# Patient Record
Sex: FEMALE | Race: WHITE | NOT HISPANIC OR LATINO | Employment: FULL TIME | URBAN - METROPOLITAN AREA
[De-identification: names, ages, dates, MRNs, and addresses within clinical notes are randomized per-mention and may not be internally consistent; named-entity substitution may affect disease eponyms.]

---

## 2023-10-07 ENCOUNTER — HOSPITAL ENCOUNTER (EMERGENCY)
Facility: HOSPITAL | Age: 63
Discharge: HOME | End: 2023-10-07
Attending: EMERGENCY MEDICINE
Payer: MEDICARE

## 2023-10-07 VITALS
DIASTOLIC BLOOD PRESSURE: 80 MMHG | OXYGEN SATURATION: 96 % | WEIGHT: 135 LBS | HEART RATE: 84 BPM | BODY MASS INDEX: 24.84 KG/M2 | RESPIRATION RATE: 16 BRPM | SYSTOLIC BLOOD PRESSURE: 128 MMHG | TEMPERATURE: 98 F | HEIGHT: 62 IN

## 2023-10-07 DIAGNOSIS — M54.50 ACUTE BILATERAL LOW BACK PAIN WITHOUT SCIATICA: ICD-10-CM

## 2023-10-07 DIAGNOSIS — M54.6 ACUTE BILATERAL THORACIC BACK PAIN: Primary | ICD-10-CM

## 2023-10-07 PROCEDURE — 99284 EMERGENCY DEPT VISIT MOD MDM: CPT

## 2023-10-07 PROCEDURE — 63600175 PHARM REV CODE 636 W HCPCS: Performed by: EMERGENCY MEDICINE

## 2023-10-07 PROCEDURE — 96372 THER/PROPH/DIAG INJ SC/IM: CPT | Performed by: EMERGENCY MEDICINE

## 2023-10-07 PROCEDURE — 25000003 PHARM REV CODE 250: Performed by: EMERGENCY MEDICINE

## 2023-10-07 RX ORDER — KETOROLAC TROMETHAMINE 30 MG/ML
15 INJECTION, SOLUTION INTRAMUSCULAR; INTRAVENOUS
Status: COMPLETED | OUTPATIENT
Start: 2023-10-07 | End: 2023-10-07

## 2023-10-07 RX ORDER — METHOCARBAMOL 750 MG/1
750 TABLET, FILM COATED ORAL 3 TIMES DAILY
Qty: 15 TABLET | Refills: 0 | Status: SHIPPED | OUTPATIENT
Start: 2023-10-07 | End: 2023-10-12

## 2023-10-07 RX ORDER — ESCITALOPRAM OXALATE 10 MG/1
10 TABLET ORAL DAILY
COMMUNITY
Start: 2023-09-29

## 2023-10-07 RX ORDER — METHOCARBAMOL 750 MG/1
750 TABLET, FILM COATED ORAL
Status: COMPLETED | OUTPATIENT
Start: 2023-10-07 | End: 2023-10-07

## 2023-10-07 RX ORDER — NAPROXEN 500 MG/1
500 TABLET ORAL 2 TIMES DAILY WITH MEALS
Qty: 14 TABLET | Refills: 0 | Status: SHIPPED | OUTPATIENT
Start: 2023-10-07 | End: 2023-10-14

## 2023-10-07 RX ORDER — OMEPRAZOLE 20 MG/1
20 CAPSULE, DELAYED RELEASE ORAL DAILY
COMMUNITY
Start: 2023-08-02

## 2023-10-07 RX ADMIN — KETOROLAC TROMETHAMINE 15 MG: 30 INJECTION INTRAMUSCULAR; INTRAVENOUS at 02:10

## 2023-10-07 RX ADMIN — METHOCARBAMOL 750 MG: 750 TABLET ORAL at 02:10

## 2023-10-07 NOTE — ED PROVIDER NOTES
"Emergency Department Encounter  Provider Note    Yury Martell  [id number]  10/7/2023    Evaluation:    History Acquisition:     Chief Complaint   Patient presents with    Back Pain     Denies bowel or bladder incontinence        History of Present Illness:  Yury Martell is a 63 y.o. female who has a past medical history of Migraine headache.    The patient presents to the ED due to diffuse upper and lower back pain.   Patient reports symptoms started a few days ago after doing leg stretching exercises in bed.  She reports no pain afterward, but woke up the next morning with severe back pain.  She has history of chronic neck and back pain. She reports she is always stressed and "lives with muscle spasms."  She is requesting an MRI to know for sure what is going on with her back.   She has been taking Aleve, topical pain medication, and hydrocodone at home without improvement.       Additional historians utilized:  none    Prior medical records were reviewed:   History of diaphragmatic hernia, followed by GI    The patient's list of active medical problems, social history, medications, and allergies as documented has been reviewed.     Past Medical History:   Diagnosis Date    Migraine headache      Past Surgical History:   Procedure Laterality Date    CARPAL TUNNEL RELEASE      TUBAL LIGATION       No family history on file.  Social History     Socioeconomic History    Marital status:    Tobacco Use    Smoking status: Never    Smokeless tobacco: Never   Substance and Sexual Activity    Alcohol use: No    Sexual activity: Never     Review of patient's allergies indicates:   Allergen Reactions    Codeine Rash       Review of Systems   Musculoskeletal:  Positive for back pain.         Physical Exam:     Initial Vitals [10/07/23 1311]   BP Pulse Resp Temp SpO2   134/75 96 15 97.7 °F (36.5 °C) 98 %      MAP       --         Physical Exam    Nursing note and vitals reviewed.  Constitutional: She appears " well-developed and well-nourished. She is not diaphoretic. No distress.   HENT:   Head: Normocephalic and atraumatic.   Mouth/Throat: Oropharynx is clear and moist.   Eyes: EOM are normal. Pupils are equal, round, and reactive to light.   Neck: No tracheal deviation present.   Cardiovascular:  Normal rate, regular rhythm, normal heart sounds and intact distal pulses.           Pulmonary/Chest: Breath sounds normal. No stridor. No respiratory distress. She has no wheezes.   Abdominal: Abdomen is soft. Bowel sounds are normal. She exhibits no distension and no mass. There is no abdominal tenderness.   Musculoskeletal:         General: No edema. Normal range of motion.        Back:       Comments: Generalized thoracic and lower lumbar paraspinous muscle tenderness and spasms.      Neurological: She is alert and oriented to person, place, and time. She has normal strength. She is not disoriented. No cranial nerve deficit or sensory deficit. She exhibits normal muscle tone. Gait normal. GCS eye subscore is 4. GCS verbal subscore is 5. GCS motor subscore is 6.   No focal weakness or sensory deficits.    Skin: Skin is warm and dry. Capillary refill takes less than 2 seconds. No pallor.   Psychiatric: She has a normal mood and affect. Her behavior is normal. Thought content normal.         ED Course:   Procedures  Medical Decision Making:    Differential Diagnoses:   Based on available information and initial assessment, Differential Diagnosis includes, but is not limited to:  Cauda equina syndrome, diskitis/osteomyelitis, epidural/paraspinal abscess, AAA, aortic dissection, post-op/hardware infection, trauma/vertebral fracture, spinal cord injury, disc herniation, spinal stenosis, sciatica, radiculopathy, neoplasm, lumbar muscle strain, muscle spasm, neuropathic pain, UTI/pyelonephritis, nephrolithiasis.        EKG:       Labs:   Labs Reviewed - No data to display  Independent review of the labs ordered include:    none    Imaging:     Imaging Results              X-Ray Thoracic Spine AP Lateral (Final result)  Result time 10/07/23 15:32:08      Final result by Josh Mccabe MD (10/07/23 15:32:08)                   Impression:      1. No convincing acute displaced fracture or dislocation of the thoracic spine noting degenerative changes.      Electronically signed by: Josh Mccabe MD  Date:    10/07/2023  Time:    15:32               Narrative:    EXAMINATION:  XR THORACIC SPINE AP LATERAL    CLINICAL HISTORY:  back pain;    TECHNIQUE:  AP and lateral views of the thoracic spine were performed.    COMPARISON:  None    FINDINGS:  Four views thoracic spine.    Lateral imaging demonstrates mild kyphotic curvature of the thoracic spine at the midportion noting disc space height loss and endplate degenerative change within the midthoracic spine.  No significant vertebral body height loss.  The facet joints are aligned.  Surgical changes are noted of the cervical spine, partially visualized.  Anterior spinal fusion hardware spans C4 through C6.  AP spinal alignment is grossly unremarkable.  No acute displaced rib fracture.  There is coarse interstitial attenuation throughout the lung zones.                                       X-Ray Lumbar Spine Ap And Lateral (Final result)  Result time 10/07/23 15:28:15      Final result by Josh Mccabe MD (10/07/23 15:28:15)                   Impression:      1. No acute displaced fracture or dislocation of the lumbar spine noting degenerative changes.      Electronically signed by: Josh Mccabe MD  Date:    10/07/2023  Time:    15:28               Narrative:    EXAMINATION:  XR LUMBAR SPINE AP AND LATERAL    CLINICAL HISTORY:  low back pain;    TECHNIQUE:  AP, lateral and spot images were performed of the lumbar spine.    COMPARISON:  None    FINDINGS:  Three views lumbar spine.    Lateral imaging demonstrates adequate alignment of the lumbar spine without significant  vertebral body height loss.  There is disc space height loss at L4-L5 noting endplate degenerative change at L4-L5.  The facet joints are aligned noting lower lumbar facet arthropathy.  The sacral segments are aligned.  AP spinal alignment is remarkable for mild levo scoliotic curvature.  The bilateral sacroiliac joints are intact.                                         Medications Given:     Medications   ketorolac injection 15 mg (15 mg Intramuscular Given 10/7/23 1424)   methocarbamoL tablet 750 mg (750 mg Oral Given 10/7/23 1424)        Additional Consideration:   Additional testing considered during clinical course: none    Social determinants of health considered during development of treatment plan include: none    Current co-morbidities considered which impacted clinical decision making: chronic neck/back pain    Case discussed with additional provider: none    ED Course as of 10/08/23 1554   Sat Oct 07, 2023   1410 SpO2: 98 % [SS]   1410 Resp: 15 [SS]   1410 Pulse: 96 [SS]   1410 Temp Source: Oral [SS]   1410 Temp: 97.7 °F (36.5 °C) [SS]   1410 BP: 134/75  Vitals reassuring [SS]   1608 X-Ray Lumbar Spine Ap And Lateral  X-ray L-spine independently interpreted: no fracture or significant subluxation.  Agree with radiologist interpretation.    [SS]   1609 X-Ray Thoracic Spine AP Lateral  T-spine x-ray independently interpreted: no fracture or significant subluxation.  Agree with radiologist interpretation.    [SS]   1621 Patient feeling better after NSAID and muscle relaxant. Will DC with supportive care, referrals to PT and PMR. [SS]      ED Course User Index  [SS] Jayro Duncan MD            Medical Decision Making  64 yo F with acute on chronic back pain.    After complete evaluation, including thorough history and physical exam, the patient's symptoms are most likely due to mechanical back pain. There are no concerning features of bilateral weakness, significant new motor/sensory deficits, saddle  anesthesia, or bowel/bladder incontinence to suggest acute cauda equina syndrome. On physical exam, there is no focal midline bony tenderness or evidence of significant trauma to suggest acute fracture or hematoma. There is no fever, history of recent surgery, or erythema/fluctuance to suggest acute diskitis, infection, or abscess. The patient was treated with supportive care and improved. Will provide short course RX of NSAID, muscle relaxants upon D/C. PT referral placed. Discussed symptomatic and supportive care instructions, including use of heating pads, stretching and ROM exercises, improving posture, and slowly resuming activity as tolerated. Counseled on need to follow-up with PCP for further evaluation if symptoms persist, including further imaging as an outpatient if symptoms persist.      Problems Addressed:  Acute bilateral low back pain without sciatica: acute illness or injury  Acute bilateral thoracic back pain: acute illness or injury    Amount and/or Complexity of Data Reviewed  External Data Reviewed: notes.  Radiology: ordered and independent interpretation performed. Decision-making details documented in ED Course.    Risk  OTC drugs.  Prescription drug management.        Clinical Impression:       ICD-10-CM ICD-9-CM   1. Acute bilateral thoracic back pain  M54.6 724.1   2. Acute bilateral low back pain without sciatica  M54.50 724.2     338.19       Discharge Medications:  Discharge Medication List as of 10/7/2023  4:25 PM        START taking these medications    Details   methocarbamoL (ROBAXIN) 750 MG Tab Take 1 tablet (750 mg total) by mouth 3 (three) times daily. for 5 days, Starting Sat 10/7/2023, Until u 10/12/2023, Print      naproxen (NAPROSYN) 500 MG tablet Take 1 tablet (500 mg total) by mouth 2 (two) times daily with meals. for 7 days, Starting Sat 10/7/2023, Until Sat 10/14/2023, Print                 Follow-up Information       Follow up With Specialties Details Why Contact Info  Additional Information    Your Primary Care Provider  Schedule an appointment as soon as possible for a visit   as soon as able     [facility] [address] Physical Medicine and Rehabilitation Schedule an appointment as soon as possible for a visit   [id number]  [facility] [id number] [phone] [facility], [address]th Floor Please park in [facility] and use Clinic elevator             ED Disposition Condition    Discharge Stable              On re-evaluation, the patient's status has improved.  After complete ED evaluation, clinical impression is most consistent with back strain.  PCP follow-up as soon as possible was recommended.    After taking into careful account the patient's history, physical exam findings, as well as empirical and objective data obtained throughout ED workup, I feel no emergent medical condition has been identified. No further evaluation or admission was felt to be required, and the patient is stable for discharge from the ED. The patient and any additional family present were updated with test results, overall clinical impression, and recommended further plan of care, including discharge instructions as provided and outpatient follow-up for continued evaluation and management as needed. All questions were answered. The patient expressed understanding and agreed with current plan for discharge and follow-up plan of care. Strict ED return precautions were provided, including return/worsening of current symptoms, new symptoms, or any other concerns.       Jayro Duncan MD  10/08/23 8028

## 2023-10-07 NOTE — DISCHARGE INSTRUCTIONS
You have a muscle strain in your back.  You may take NSAIDs and muscle relaxants for pain.  In addition to medication, begin gentle stretching exercises every day, and do your best to stay active to help with muscle tightness and pain.    You may also use hot showers/heating packs for muscle spasm and pain.  Follow up with Physical Therapy as soon as possible.   Follow-up with your primary care provider within the next week if your symptoms are not improving.  Return to the ED for any severe pain, weakness/numbness in your arms or legs, inability to walk, incontinence of urine or stool, or any other concerns.      Thank you for choosing [facility]!     Our goal in the Emergency Department is to always provide outstanding medical care. You may receive a survey by mail or e-mail in the next week regarding your experience today. We would greatly appreciate you completing and returning the survey. Your feedback provides us with a way to recognize our staff who provide very good care, and it helps us learn how to improve when your experience was below our aspiration of excellence.      It is important to remember that some problems are difficult to diagnose and may not be found during your first visit. Be sure to follow up with your primary care doctor and review any labs/imaging that was performed during your visit with them. If you do not have a primary care doctor, you may contact the one listed on your discharge paperwork, or you may also call the [facility] Appointment Desk at [phone] to schedule an appointment.     All medications may potentially have side effects and it is impossible to predict which medications may give you side effects. If you feel that you are having a negative effect of any medication you should immediately stop taking them and seek medical attention.  Do not drive or make any important decisions for 24 hours if you have received any pain medications, sedatives or mood  altering drugs during your ER visit.    We appreciate you trusting us with your medical care. We will be happy to take care of you for all of your future medical needs. You may return to the ER at any time for any new/concerning symptoms, worsening condition, or failure to improve. We hope you feel better soon.     Sincerely,    Jayro Duncan Jr., MD  Board-Certified Emergency Medicine Physician  [facility]

## 2023-10-07 NOTE — ED NOTES
LOC: The patient is awake, alert and aware of environment with an appropriate affect, the patient is oriented x 3 and speaking appropriately.  APPEARANCE:  patient is clean and well groomed, patient's clothing is properly fastened.  SKIN: The skin is warm and dry, color consistent with ethnicity  MUSCULOSKELETAL: Patient moving all extremities spontaneously, no obvious swelling or deformities noted.  RESPIRATORY: Airway is open and patent, respirations are spontaneous, patient has a normal effort and rate  ABDOMEN: Soft and non tender to palpation, no distention noted    Patient arrives from home with the complaint of back pain. Patient states 3 nights ago she was laying in bed and lifted her left leg in bed to stretch and started to move her leg in a circular motion when she felt a crunch in her left lower back. Patient states she has been having pain to her left lower back since then and the pain travels to her mid lower back and mid back area. Patient is able to ambulate without any assistance, denies any loss of bladder or bowel control. Patient is in no acute distress, will continue to monitor.

## 2025-07-07 ENCOUNTER — OFFICE VISIT (OUTPATIENT)
Dept: URGENT CARE | Facility: CLINIC | Age: 65
End: 2025-07-07
Payer: MEDICARE

## 2025-07-07 VITALS
OXYGEN SATURATION: 97 % | RESPIRATION RATE: 18 BRPM | HEART RATE: 86 BPM | DIASTOLIC BLOOD PRESSURE: 81 MMHG | SYSTOLIC BLOOD PRESSURE: 131 MMHG | WEIGHT: 135 LBS | TEMPERATURE: 98 F | BODY MASS INDEX: 24.84 KG/M2 | HEIGHT: 62 IN

## 2025-07-07 DIAGNOSIS — R11.0 NAUSEA: ICD-10-CM

## 2025-07-07 DIAGNOSIS — N30.01 ACUTE CYSTITIS WITH HEMATURIA: Primary | ICD-10-CM

## 2025-07-07 DIAGNOSIS — R30.0 BURNING WITH URINATION: ICD-10-CM

## 2025-07-07 LAB
BILIRUBIN, UA POC OHS: NEGATIVE
BLOOD, UA POC OHS: ABNORMAL
CLARITY, UA POC OHS: CLEAR
COLOR, UA POC OHS: YELLOW
GLUCOSE, UA POC OHS: NEGATIVE
KETONES, UA POC OHS: NEGATIVE
LEUKOCYTES, UA POC OHS: ABNORMAL
NITRITE, UA POC OHS: POSITIVE
PH, UA POC OHS: 6
PROTEIN, UA POC OHS: 100
SPECIFIC GRAVITY, UA POC OHS: 1.01
UROBILINOGEN, UA POC OHS: 0.2

## 2025-07-07 PROCEDURE — 87086 URINE CULTURE/COLONY COUNT: CPT | Performed by: NURSE PRACTITIONER

## 2025-07-07 PROCEDURE — 81003 URINALYSIS AUTO W/O SCOPE: CPT | Mod: QW,S$GLB,, | Performed by: NURSE PRACTITIONER

## 2025-07-07 PROCEDURE — 99204 OFFICE O/P NEW MOD 45 MIN: CPT | Mod: S$GLB,,, | Performed by: NURSE PRACTITIONER

## 2025-07-07 RX ORDER — ONDANSETRON 8 MG/1
8 TABLET, ORALLY DISINTEGRATING ORAL
Status: COMPLETED | OUTPATIENT
Start: 2025-07-07 | End: 2025-07-07

## 2025-07-07 RX ORDER — NITROFURANTOIN 25; 75 MG/1; MG/1
100 CAPSULE ORAL 2 TIMES DAILY
Qty: 10 CAPSULE | Refills: 0 | Status: SHIPPED | OUTPATIENT
Start: 2025-07-07 | End: 2025-07-12

## 2025-07-07 RX ORDER — ONDANSETRON 8 MG/1
8 TABLET, ORALLY DISINTEGRATING ORAL
Status: DISCONTINUED | OUTPATIENT
Start: 2025-07-07 | End: 2025-07-07

## 2025-07-07 RX ORDER — ONDANSETRON 4 MG/1
8 TABLET, FILM COATED ORAL EVERY 8 HOURS PRN
Qty: 40 TABLET | Refills: 0 | Status: SHIPPED | OUTPATIENT
Start: 2025-07-07 | End: 2025-07-17

## 2025-07-07 RX ORDER — PHENAZOPYRIDINE HYDROCHLORIDE 100 MG/1
100 TABLET, FILM COATED ORAL 3 TIMES DAILY PRN
Qty: 6 TABLET | Refills: 0 | Status: SHIPPED | OUTPATIENT
Start: 2025-07-07 | End: 2025-07-09

## 2025-07-07 RX ADMIN — ONDANSETRON 8 MG: 8 TABLET, ORALLY DISINTEGRATING ORAL at 11:07

## 2025-07-07 NOTE — PROGRESS NOTES
"Subjective:      Patient ID: Yury Martell is a 65 y.o. female.    Vitals:  height is 5' 2" (1.575 m) and weight is 61.2 kg (135 lb). Her oral temperature is 97.9 °F (36.6 °C). Her blood pressure is 131/81 and her pulse is 86. Her respiration is 18 and oxygen saturation is 97%.     Chief Complaint: Urinary Tract Infection    Pt states that she woke up this morning with frequent , urgency and burning while urination.  Pt states that when she wiped she had blood (clot) and she also vomited but states that she always vomits when drinking a lot of water .     Urinary Tract Infection   This is a new problem. The current episode started today. The problem occurs every urination. The problem has been unchanged. The quality of the pain is described as burning. The pain is at a severity of 0/10. The patient is experiencing no pain. There has been no fever. Associated symptoms include frequency, hematuria and urgency. She has tried nothing for the symptoms.       Genitourinary:  Positive for dysuria, frequency, urgency and hematuria.      Objective:     Physical Exam   Constitutional: She is oriented to person, place, and time. She appears well-developed.   HENT:   Head: Normocephalic and atraumatic.   Ears:   Right Ear: External ear normal.   Left Ear: External ear normal.   Nose: Nose normal. No nasal deformity. No epistaxis.   Mouth/Throat: Oropharynx is clear and moist and mucous membranes are normal.   Eyes: Lids are normal.   Neck: Trachea normal and phonation normal. Neck supple.   Cardiovascular: Normal pulses.   Pulmonary/Chest: Effort normal.   Abdominal: Normal appearance and bowel sounds are normal. She exhibits no distension. Soft. protuberant There is abdominal tenderness in the suprapubic area. There is no rebound, no guarding, no left CVA tenderness and no right CVA tenderness.   Neurological: She is alert and oriented to person, place, and time.   Skin: Skin is warm, dry and intact.   Psychiatric: Her speech " is normal and behavior is normal.   Nursing note and vitals reviewed.      Results for orders placed or performed in visit on 07/07/25   POCT Urinalysis(Instrument)    Collection Time: 07/07/25 11:10 AM   Result Value Ref Range    Color, POC UA Yellow Yellow, Straw, Colorless    Clarity, POC UA Clear Clear    Glucose, POC UA Negative Negative    Bilirubin, POC UA Negative Negative    Ketones, POC UA Negative Negative    Spec Grav POC UA 1.015 1.005 - 1.030    Blood, POC UA Large (A) Negative    pH, POC UA 6.0 5.0 - 8.0    Protein, POC  (A) Negative    Urobilinogen, POC UA 0.2 <=1.0    Nitrite, POC UA Positive (A) Negative    WBC, POC UA Large (A) Negative       Assessment:     1. Acute cystitis with hematuria    2. Burning with urination    3. Nausea        Plan:       Acute cystitis with hematuria  -     Urine Culture High Risk ($$)  -     nitrofurantoin, macrocrystal-monohydrate, (MACROBID) 100 MG capsule; Take 1 capsule (100 mg total) by mouth 2 (two) times daily. for 5 days  Dispense: 10 capsule; Refill: 0    Burning with urination  -     POCT Urinalysis(Instrument)  -     Urine Culture High Risk ($$)  -     phenazopyridine (PYRIDIUM) 100 MG tablet; Take 1 tablet (100 mg total) by mouth 3 (three) times daily as needed for Pain.  Dispense: 6 tablet; Refill: 0    Nausea  -     ondansetron disintegrating tablet 8 mg      Patient Instructions   Discharge instructions for acute cystitis with hematuria, burning with urination  Patient is to start Macrobid as prescribed twice a day for 5 days  Patient can start Pyridium 100 mg by mouth for burning 3 times daily as needed for pain or discomfort  Push fluids maintain hydration  Please check your [facility] portal for urine culture findings test results can take up to 48 hours, we will use the portal to help communicate with you any findings or changes that need to be made.   When do I need to call the doctor?   You have very bad pain in your back, shoulder, or  belly.  You have a fever of 100.4°F (38°C) or higher; shaking chills or sweats even though you are taking antibiotics.  You notice more blood in your urine.  Your signs get worse or do not improve within 24 hours of starting treatment.  You are not able to urinate for more than 8 hours.  Your signs come back after treatment has stopped.    1) See orders for this visit as documented in the electronic medical record.  2) Symptomatic therapy suggested: use acetaminophen/ibuprofen every 6-8 hours prn pain or fever, push fluids.   3) Call or return to clinic prn if these symptoms worsen or fail to improve as anticipated.    Discussed results/diagnosis/plan with patient in clinic.  We had shared decision making for patient's treatment. Patient verbalized understanding and in agreement with current treatment plan.     Patient was instructed to return for re-evaluation with urgent care or PCP for continued outpatient workup and management if symptoms do not improve/worsening symptoms. Strict ED versus clinic precautions given in depth.    Discharge and follow-up instructions given verbally/printed with the patient who expressed understanding. The instructions and results are also available on [employer].      - You must understand that you have received an Urgent Care treatment only and that you may be released before all of your medical problems are known or treated.   - You, the patient, will arrange for follow up care as instructed.   - Follow up with your PCP or specialty clinic as directed in the next 1-2 weeks if not improved or as needed.  You can call [phone] to schedule an appointment with the appropriate provider.   - If your condition worsens or fails to improve we recommend that you receive another evaluation at the ER immediately or contact your PCP to discuss your concerns or return here.        BEV Leslie

## 2025-07-07 NOTE — PATIENT INSTRUCTIONS
Discharge instructions for acute cystitis with hematuria, burning with urination  Patient is to start Macrobid as prescribed twice a day for 5 days  Patient can start Pyridium 100 mg by mouth for burning 3 times daily as needed for pain or discomfort  Push fluids maintain hydration  Please check your [facility] portal for urine culture findings test results can take up to 48 hours, we will use the portal to help communicate with you any findings or changes that need to be made.   When do I need to call the doctor?   You have very bad pain in your back, shoulder, or belly.  You have a fever of 100.4°F (38°C) or higher; shaking chills or sweats even though you are taking antibiotics.  You notice more blood in your urine.  Your signs get worse or do not improve within 24 hours of starting treatment.  You are not able to urinate for more than 8 hours.  Your signs come back after treatment has stopped.    1) See orders for this visit as documented in the electronic medical record.  2) Symptomatic therapy suggested: use acetaminophen/ibuprofen every 6-8 hours prn pain or fever, push fluids.   3) Call or return to clinic prn if these symptoms worsen or fail to improve as anticipated.    Discussed results/diagnosis/plan with patient in clinic.  We had shared decision making for patient's treatment. Patient verbalized understanding and in agreement with current treatment plan.     Patient was instructed to return for re-evaluation with urgent care or PCP for continued outpatient workup and management if symptoms do not improve/worsening symptoms. Strict ED versus clinic precautions given in depth.    Discharge and follow-up instructions given verbally/printed with the patient who expressed understanding. The instructions and results are also available on [employer].      - You must understand that you have received an Urgent Care treatment only and that you may be released before all of your medical problems are known or treated.    - You, the patient, will arrange for follow up care as instructed.   - Follow up with your PCP or specialty clinic as directed in the next 1-2 weeks if not improved or as needed.  You can call [phone] to schedule an appointment with the appropriate provider.   - If your condition worsens or fails to improve we recommend that you receive another evaluation at the ER immediately or contact your PCP to discuss your concerns or return here.        BEV Leslie

## 2025-07-10 LAB — BACTERIA UR CULT: ABNORMAL

## 2025-07-11 ENCOUNTER — TELEPHONE (OUTPATIENT)
Dept: URGENT CARE | Facility: CLINIC | Age: 65
End: 2025-07-11
Payer: MEDICARE

## 2025-07-11 NOTE — TELEPHONE ENCOUNTER
Patient returned call.  Reviewed urine culture with patient.  Treated appropriately with Macrobid.  Symptoms improving.

## 2025-07-11 NOTE — TELEPHONE ENCOUNTER
Patient does not have access to MyChart.  Attempted to call patient to go over urine culture.  Treated appropriately with Macrobid.  Left voicemail to have patient call back.